# Patient Record
Sex: MALE
[De-identification: names, ages, dates, MRNs, and addresses within clinical notes are randomized per-mention and may not be internally consistent; named-entity substitution may affect disease eponyms.]

---

## 2022-12-21 PROBLEM — Z00.00 ENCOUNTER FOR PREVENTIVE HEALTH EXAMINATION: Status: ACTIVE | Noted: 2022-12-21

## 2022-12-27 ENCOUNTER — APPOINTMENT (OUTPATIENT)
Dept: NEUROSURGERY | Facility: CLINIC | Age: 37
End: 2022-12-27

## 2022-12-27 ENCOUNTER — APPOINTMENT (OUTPATIENT)
Dept: NEUROSURGERY | Facility: CLINIC | Age: 37
End: 2022-12-27
Payer: SELF-PAY

## 2022-12-27 DIAGNOSIS — H93.A9 PULSATILE TINNITUS, UNSPECIFIED EAR: ICD-10-CM

## 2022-12-27 PROCEDURE — EDU01: CPT

## 2022-12-27 NOTE — HISTORY OF PRESENT ILLNESS
[de-identified] : I conducted a remote educational consult with DOUGIE MUNOZ on 12/27/2022. I explained that I am only able to provide an educational consult and not render treatment as I am not licensed in the state in which [place] is located. A written informed consent for the educational consult was obtained and is included in the EHR. DOUGIE MUNOZ is informed that there would be a $250 cost associated with the conduct of the remote educational consult. \par \par Mr. MUNOZ is a pleasant 37 year old male who presents today with a chief complaint of RIGHT ear pulsatile tinnitus.  It first started in 11/2021 around the same time that he had a respiratory virus.  Since that time it has been getting progressively better, but still present.  It is described as a constant, whooshing sound that is in sync with his pulse.  If he takes 3 deep breaths, the sound improves.  He also reports a lot of popping in his right ear.  Anxiety makes it worse. \par \par He has been to the ENT in the past and had a normal hearing test.  \par \par No episodes of stroke like symptoms in the past or recent neck pain.  Chronic right neck and shoulder pain.  \par \par He denies any headaches, blurry vision or diplopia.

## 2022-12-27 NOTE — ASSESSMENT
[FreeTextEntry1] : Impression:\par RIGHT Pulsatile Tinnitus\par Improving with Time\par No Headaches or Vision Complaints\par \par We discussed possible causes of pulsatile tinnitus including:\par ENT causes such as semicircular canal dehiscence, tumor, ototoxicity\par Cardiac causes such as aortic stenosis, valve disease, HTN, other causes of heart murmur\par Anemia\par Thyroid disease\par Cerebrovascular causes such as arteriovenous malformation (AVM), dural arteriovenous fistula (dAVF), venous sinus stenosis, venous aneurysm, large trans-mastoid emissary vein, carotid stenosis, jugular bulb diverticulum \par \par MRV 10/2022 reveals dominant LEFT transverse sinus without stenosis\par MRA Head 10/2022 reveals no dural AVF or AVM; suspicion for dissection of the right ICA; 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  I am suspicious for right carotid dissection and recommend MRA of the neck for further clarification.   \par \par Recommendations:\par Evaluation by Neuro-otologist \par MRA Neck w/wo\par Follow Up with Me After the Above

## 2022-12-27 NOTE — REASON FOR VISIT
[Home] : at home, [unfilled] , at the time of the visit. [Medical Office: ([facility])___] : at the medical office located in  [Patient] : the patient [Self] : self [New Patient Visit] : a new patient visit [FreeTextEntry1] : Pulsatile Tinnitus